# Patient Record
Sex: MALE | Race: OTHER | NOT HISPANIC OR LATINO | URBAN - METROPOLITAN AREA
[De-identification: names, ages, dates, MRNs, and addresses within clinical notes are randomized per-mention and may not be internally consistent; named-entity substitution may affect disease eponyms.]

---

## 2019-09-10 ENCOUNTER — EMERGENCY (EMERGENCY)
Facility: HOSPITAL | Age: 39
LOS: 1 days | Discharge: HOME | End: 2019-09-10
Attending: EMERGENCY MEDICINE | Admitting: EMERGENCY MEDICINE
Payer: COMMERCIAL

## 2019-09-10 VITALS
OXYGEN SATURATION: 95 % | WEIGHT: 184.97 LBS | TEMPERATURE: 98 F | HEART RATE: 76 BPM | DIASTOLIC BLOOD PRESSURE: 77 MMHG | RESPIRATION RATE: 16 BRPM | SYSTOLIC BLOOD PRESSURE: 116 MMHG

## 2019-09-10 DIAGNOSIS — S32.9XXA FRACTURE OF UNSPECIFIED PARTS OF LUMBOSACRAL SPINE AND PELVIS, INITIAL ENCOUNTER FOR CLOSED FRACTURE: ICD-10-CM
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
## 2019-09-10 DIAGNOSIS — S42.032A DISPLACED FRACTURE OF LATERAL END OF LEFT CLAVICLE, INITIAL ENCOUNTER FOR CLOSED FRACTURE: ICD-10-CM

## 2019-09-10 DIAGNOSIS — S22.32XA FRACTURE OF ONE RIB, LEFT SIDE, INITIAL ENCOUNTER FOR CLOSED FRACTURE: ICD-10-CM

## 2019-09-10 DIAGNOSIS — M25.512 PAIN IN LEFT SHOULDER: ICD-10-CM

## 2019-09-10 DIAGNOSIS — S50.812A ABRASION OF LEFT FOREARM, INITIAL ENCOUNTER: ICD-10-CM
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
## 2019-09-10 LAB
ALBUMIN SERPL ELPH-MCNC: 4.3 G/DL — SIGNIFICANT CHANGE UP (ref 3.4–5)
ALP SERPL-CCNC: 74 U/L — SIGNIFICANT CHANGE UP (ref 40–120)
ALT FLD-CCNC: 40 U/L — SIGNIFICANT CHANGE UP (ref 12–42)
ANION GAP SERPL CALC-SCNC: 6 MMOL/L — LOW (ref 9–16)
APTT BLD: 27.5 SEC — SIGNIFICANT CHANGE UP (ref 27.5–36.3)
AST SERPL-CCNC: 43 U/L — HIGH (ref 15–37)
BASOPHILS NFR BLD AUTO: 0.1 % — SIGNIFICANT CHANGE UP (ref 0–2)
BILIRUB SERPL-MCNC: 0.5 MG/DL — SIGNIFICANT CHANGE UP (ref 0.2–1.2)
BUN SERPL-MCNC: 18 MG/DL — SIGNIFICANT CHANGE UP (ref 7–23)
CALCIUM SERPL-MCNC: 9.5 MG/DL — SIGNIFICANT CHANGE UP (ref 8.5–10.5)
CHLORIDE SERPL-SCNC: 108 MMOL/L — SIGNIFICANT CHANGE UP (ref 96–108)
CO2 SERPL-SCNC: 29 MMOL/L — SIGNIFICANT CHANGE UP (ref 22–31)
CREAT SERPL-MCNC: 0.9 MG/DL — SIGNIFICANT CHANGE UP (ref 0.5–1.3)
EOSINOPHIL NFR BLD AUTO: 0.1 % — SIGNIFICANT CHANGE UP (ref 0–6)
GLUCOSE SERPL-MCNC: 117 MG/DL — HIGH (ref 70–99)
HCT VFR BLD CALC: 45.3 % — SIGNIFICANT CHANGE UP (ref 39–50)
HGB BLD-MCNC: 15.3 G/DL — SIGNIFICANT CHANGE UP (ref 13–17)
IMM GRANULOCYTES NFR BLD AUTO: 1.2 % — SIGNIFICANT CHANGE UP (ref 0–1.5)
INR BLD: 1.07 — SIGNIFICANT CHANGE UP (ref 0.88–1.16)
LYMPHOCYTES # BLD AUTO: 9.8 % — LOW (ref 13–44)
MAGNESIUM SERPL-MCNC: 2.3 MG/DL — SIGNIFICANT CHANGE UP (ref 1.6–2.6)
MCHC RBC-ENTMCNC: 31 PG — SIGNIFICANT CHANGE UP (ref 27–34)
MCHC RBC-ENTMCNC: 33.8 G/DL — SIGNIFICANT CHANGE UP (ref 32–36)
MCV RBC AUTO: 91.9 FL — SIGNIFICANT CHANGE UP (ref 80–100)
MONOCYTES NFR BLD AUTO: 3.9 % — SIGNIFICANT CHANGE UP (ref 2–14)
NEUTROPHILS NFR BLD AUTO: 84.9 % — HIGH (ref 43–77)
PLATELET # BLD AUTO: 221 K/UL — SIGNIFICANT CHANGE UP (ref 150–400)
POTASSIUM SERPL-MCNC: 4.2 MMOL/L — SIGNIFICANT CHANGE UP (ref 3.5–5.3)
POTASSIUM SERPL-SCNC: 4.2 MMOL/L — SIGNIFICANT CHANGE UP (ref 3.5–5.3)
PROT SERPL-MCNC: 7.5 G/DL — SIGNIFICANT CHANGE UP (ref 6.4–8.2)
PROTHROM AB SERPL-ACNC: 11.9 SEC — SIGNIFICANT CHANGE UP (ref 10–12.9)
RBC # BLD: 4.93 M/UL — SIGNIFICANT CHANGE UP (ref 4.2–5.8)
RBC # FLD: 13.2 % — SIGNIFICANT CHANGE UP (ref 10.3–14.5)
SODIUM SERPL-SCNC: 143 MMOL/L — SIGNIFICANT CHANGE UP (ref 132–145)
WBC # BLD: 17.1 K/UL — HIGH (ref 3.8–10.5)
WBC # FLD AUTO: 17.1 K/UL — HIGH (ref 3.8–10.5)

## 2019-09-10 PROCEDURE — 73701 CT LOWER EXTREMITY W/DYE: CPT | Mod: 26,LT

## 2019-09-10 PROCEDURE — 99285 EMERGENCY DEPT VISIT HI MDM: CPT

## 2019-09-10 PROCEDURE — 73200 CT UPPER EXTREMITY W/O DYE: CPT | Mod: 26,LT

## 2019-09-10 PROCEDURE — 71260 CT THORAX DX C+: CPT | Mod: 26

## 2019-09-10 PROCEDURE — 74177 CT ABD & PELVIS W/CONTRAST: CPT | Mod: 26

## 2019-09-10 RX ORDER — ONDANSETRON 8 MG/1
4 TABLET, FILM COATED ORAL ONCE
Refills: 0 | Status: COMPLETED | OUTPATIENT
Start: 2019-09-10 | End: 2019-09-10

## 2019-09-10 RX ORDER — MORPHINE SULFATE 50 MG/1
4 CAPSULE, EXTENDED RELEASE ORAL ONCE
Refills: 0 | Status: DISCONTINUED | OUTPATIENT
Start: 2019-09-10 | End: 2019-09-10

## 2019-09-10 RX ORDER — SODIUM CHLORIDE 9 MG/ML
1000 INJECTION INTRAMUSCULAR; INTRAVENOUS; SUBCUTANEOUS ONCE
Refills: 0 | Status: COMPLETED | OUTPATIENT
Start: 2019-09-10 | End: 2019-09-10

## 2019-09-10 RX ADMIN — SODIUM CHLORIDE 1000 MILLILITER(S): 9 INJECTION INTRAMUSCULAR; INTRAVENOUS; SUBCUTANEOUS at 22:55

## 2019-09-10 RX ADMIN — ONDANSETRON 4 MILLIGRAM(S): 8 TABLET, FILM COATED ORAL at 22:55

## 2019-09-10 RX ADMIN — MORPHINE SULFATE 4 MILLIGRAM(S): 50 CAPSULE, EXTENDED RELEASE ORAL at 22:55

## 2019-09-10 NOTE — ED PROVIDER NOTE - PROGRESS NOTE DETAILS
discussed with [place] trauma service and pt cleared by trauma attending and feels this is more appropriate to transfer to ortho service. spoke with dr florentino from [facility] and requests xray pelvis and to send images of CT findings and will call back to discuss accepting the pt. pt to transfer ER-ER to [place] per ortho request and also discussed with ER attending dr lu and accepts.  ortho attending jaky.  pt hemodynamically stable

## 2019-09-10 NOTE — ED PROVIDER NOTE - MUSCULOSKELETAL, MLM
L clavicular tenderness and pain limiting ROM of L shoulder. Slight tenderness to palpation over L flank and L hip. Pain on ROM of L hip. Abrasion to L forearm. No midline spine tenderness to C/L/T spine..

## 2019-09-10 NOTE — ED ADULT NURSE NOTE - NSIMPLEMENTINTERV_GEN_ALL_ED
Implemented All Fall Risk Interventions:  [place] to call system. Call bell, personal items and telephone within reach. Instruct patient to call for assistance. Room bathroom lighting operational. Non-slip footwear when patient is off stretcher. Physically safe environment: no spills, clutter or unnecessary equipment. Stretcher in lowest position, wheels locked, appropriate side rails in place. Provide visual cue, wrist band, yellow gown, etc. Monitor gait and stability. Monitor for mental status changes and reorient to person, place, and time. Review medications for side effects contributing to fall risk. Reinforce activity limits and safety measures with patient and family.

## 2019-09-10 NOTE — ED PROVIDER NOTE - CARE PLAN
Principal Discharge DX:	Closed displaced fracture of acromial end of left clavicle, initial encounter  Secondary Diagnosis:	Rib fractures  Secondary Diagnosis:	Pelvis fracture
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
## 2019-09-11 VITALS
TEMPERATURE: 99 F | SYSTOLIC BLOOD PRESSURE: 132 MMHG | HEART RATE: 92 BPM | RESPIRATION RATE: 18 BRPM | OXYGEN SATURATION: 97 % | DIASTOLIC BLOOD PRESSURE: 74 MMHG

## 2019-09-11 PROCEDURE — 72170 X-RAY EXAM OF PELVIS: CPT | Mod: 26

## 2019-09-11 RX ORDER — MORPHINE SULFATE 50 MG/1
4 CAPSULE, EXTENDED RELEASE ORAL ONCE
Refills: 0 | Status: DISCONTINUED | OUTPATIENT
Start: 2019-09-11 | End: 2019-09-11

## 2019-09-11 RX ADMIN — MORPHINE SULFATE 4 MILLIGRAM(S): 50 CAPSULE, EXTENDED RELEASE ORAL at 01:58

## 2019-09-11 RX ADMIN — SODIUM CHLORIDE 1000 MILLILITER(S): 9 INJECTION INTRAMUSCULAR; INTRAVENOUS; SUBCUTANEOUS at 01:58

## 2019-09-11 RX ADMIN — MORPHINE SULFATE 4 MILLIGRAM(S): 50 CAPSULE, EXTENDED RELEASE ORAL at 06:01

## 2019-09-11 RX ADMIN — MORPHINE SULFATE 4 MILLIGRAM(S): 50 CAPSULE, EXTENDED RELEASE ORAL at 06:02

## 2019-09-11 NOTE — ED ADULT NURSE REASSESSMENT NOTE - NS ED NURSE REASSESS COMMENT FT1
Patient remains IN RM 17, appears comfortable. No distress. Accepted to [place] ED under Doctor Trent. Report given to Ms. Edinson MCMAHON.

## 2023-11-12 NOTE — ED ADULT NURSE NOTE - CAS DISCH TRANSFER METHOD
Discussed his recent BMP with ongoing mild FUAD. Only new med recent 2 months is testosterone. -130's mmHg on HCTZ, amlodipine, lisinopril and low dose metoprolol.    He lost some weight with eating low carb diet and been hydrating more ever since his mild FUAD 2 weeks ago.    We agreed to:  - stop HCTZ  - repeat BMP in a week (he has an order)  - updated am and pm BP log around 11/20/23 before we try an alternative to HCTZ such as hydralazine   - if Cr still abnormal, will get renal US with duplex and refer to nephrology(they will most likely order urine studies then).    They were appreciative of the call.  
Ambulance
